# Patient Record
Sex: MALE | Race: WHITE | Employment: FULL TIME | URBAN - METROPOLITAN AREA
[De-identification: names, ages, dates, MRNs, and addresses within clinical notes are randomized per-mention and may not be internally consistent; named-entity substitution may affect disease eponyms.]

---

## 2022-08-04 ENCOUNTER — APPOINTMENT (OUTPATIENT)
Dept: GENERAL RADIOLOGY | Age: 27
End: 2022-08-04

## 2022-08-04 ENCOUNTER — HOSPITAL ENCOUNTER (EMERGENCY)
Dept: CT IMAGING | Age: 27
Discharge: HOME | End: 2022-08-07

## 2022-08-04 ENCOUNTER — HOSPITAL ENCOUNTER (EMERGENCY)
Age: 27
Discharge: HOME | End: 2022-08-04
Attending: EMERGENCY MEDICINE

## 2022-08-04 VITALS
SYSTOLIC BLOOD PRESSURE: 148 MMHG | HEIGHT: 68 IN | RESPIRATION RATE: 15 BRPM | TEMPERATURE: 98 F | BODY MASS INDEX: 25.01 KG/M2 | WEIGHT: 165 LBS | OXYGEN SATURATION: 99 % | HEART RATE: 95 BPM | DIASTOLIC BLOOD PRESSURE: 96 MMHG

## 2022-08-04 DIAGNOSIS — S06.0X9A CONCUSSION WITH LOSS OF CONSCIOUSNESS, INITIAL ENCOUNTER: Primary | ICD-10-CM

## 2022-08-04 DIAGNOSIS — S43.102A ACROMIOCLAVICULAR SEPARATION, LEFT, INITIAL ENCOUNTER: ICD-10-CM

## 2022-08-04 LAB
ALBUMIN SERPL-MCNC: 4.4 G/DL (ref 3.5–5)
ALBUMIN/GLOB SERPL: 1.1 {RATIO} (ref 1.2–3.5)
ALP SERPL-CCNC: 60 U/L (ref 50–136)
ALT SERPL-CCNC: 64 U/L (ref 12–65)
ANION GAP SERPL CALC-SCNC: 7 MMOL/L (ref 7–16)
AST SERPL-CCNC: 40 U/L (ref 15–37)
BASOPHILS # BLD: 0.1 K/UL (ref 0–0.2)
BASOPHILS NFR BLD: 0 % (ref 0–2)
BILIRUB SERPL-MCNC: 0.6 MG/DL (ref 0.2–1.1)
BUN SERPL-MCNC: 30 MG/DL (ref 6–23)
CALCIUM SERPL-MCNC: 9.5 MG/DL (ref 8.3–10.4)
CHLORIDE SERPL-SCNC: 104 MMOL/L (ref 98–107)
CO2 SERPL-SCNC: 26 MMOL/L (ref 21–32)
CREAT SERPL-MCNC: 1 MG/DL (ref 0.8–1.5)
DIFFERENTIAL METHOD BLD: ABNORMAL
EOSINOPHIL # BLD: 0 K/UL (ref 0–0.8)
EOSINOPHIL NFR BLD: 0 % (ref 0.5–7.8)
ERYTHROCYTE [DISTWIDTH] IN BLOOD BY AUTOMATED COUNT: 11.9 % (ref 11.9–14.6)
GLOBULIN SER CALC-MCNC: 3.9 G/DL (ref 2.3–3.5)
GLUCOSE SERPL-MCNC: 113 MG/DL (ref 65–100)
HCT VFR BLD AUTO: 44 % (ref 41.1–50.3)
HGB BLD-MCNC: 15.6 G/DL (ref 13.6–17.2)
IMM GRANULOCYTES # BLD AUTO: 0.1 K/UL (ref 0–0.5)
IMM GRANULOCYTES NFR BLD AUTO: 1 % (ref 0–5)
LYMPHOCYTES # BLD: 1.6 K/UL (ref 0.5–4.6)
LYMPHOCYTES NFR BLD: 10 % (ref 13–44)
MCH RBC QN AUTO: 32 PG (ref 26.1–32.9)
MCHC RBC AUTO-ENTMCNC: 35.5 G/DL (ref 31.4–35)
MCV RBC AUTO: 90.3 FL (ref 79.6–97.8)
MONOCYTES # BLD: 1.2 K/UL (ref 0.1–1.3)
MONOCYTES NFR BLD: 7 % (ref 4–12)
NEUTS SEG # BLD: 13.4 K/UL (ref 1.7–8.2)
NEUTS SEG NFR BLD: 82 % (ref 43–78)
NRBC # BLD: 0 K/UL (ref 0–0.2)
PLATELET # BLD AUTO: 309 K/UL (ref 150–450)
PMV BLD AUTO: 9.2 FL (ref 9.4–12.3)
POTASSIUM SERPL-SCNC: 3.7 MMOL/L (ref 3.5–5.1)
PROT SERPL-MCNC: 8.3 G/DL (ref 6.3–8.2)
RBC # BLD AUTO: 4.87 M/UL (ref 4.23–5.6)
SODIUM SERPL-SCNC: 137 MMOL/L (ref 138–145)
WBC # BLD AUTO: 16.3 K/UL (ref 4.3–11.1)

## 2022-08-04 PROCEDURE — 80053 COMPREHEN METABOLIC PANEL: CPT

## 2022-08-04 PROCEDURE — 73030 X-RAY EXAM OF SHOULDER: CPT

## 2022-08-04 PROCEDURE — 70450 CT HEAD/BRAIN W/O DYE: CPT

## 2022-08-04 PROCEDURE — 99285 EMERGENCY DEPT VISIT HI MDM: CPT

## 2022-08-04 PROCEDURE — 71260 CT THORAX DX C+: CPT

## 2022-08-04 PROCEDURE — 72125 CT NECK SPINE W/O DYE: CPT

## 2022-08-04 PROCEDURE — 85025 COMPLETE CBC W/AUTO DIFF WBC: CPT

## 2022-08-04 PROCEDURE — 2580000003 HC RX 258: Performed by: PHYSICIAN ASSISTANT

## 2022-08-04 PROCEDURE — 6360000004 HC RX CONTRAST MEDICATION: Performed by: PHYSICIAN ASSISTANT

## 2022-08-04 RX ORDER — SODIUM CHLORIDE 0.9 % (FLUSH) 0.9 %
10 SYRINGE (ML) INJECTION
Status: COMPLETED | OUTPATIENT
Start: 2022-08-04 | End: 2022-08-04

## 2022-08-04 RX ORDER — 0.9 % SODIUM CHLORIDE 0.9 %
100 INTRAVENOUS SOLUTION INTRAVENOUS
Status: COMPLETED | OUTPATIENT
Start: 2022-08-04 | End: 2022-08-04

## 2022-08-04 RX ADMIN — SODIUM CHLORIDE 100 ML: 9 INJECTION, SOLUTION INTRAVENOUS at 17:11

## 2022-08-04 RX ADMIN — SODIUM CHLORIDE, PRESERVATIVE FREE 10 ML: 5 INJECTION INTRAVENOUS at 17:11

## 2022-08-04 RX ADMIN — IOPAMIDOL 100 ML: 755 INJECTION, SOLUTION INTRAVENOUS at 17:11

## 2022-08-04 ASSESSMENT — PAIN SCALES - GENERAL: PAINLEVEL_OUTOF10: 4

## 2022-08-04 NOTE — Clinical Note
Juan Jose Dsouza was seen and treated in our emergency department on 8/4/2022. He may return to work on 08/09/2022. If you have any questions or concerns, please don't hesitate to call.       Aylin Batista MD

## 2022-08-04 NOTE — ED TRIAGE NOTES
Patient is a 79-year-old male who fell from 1 story off of scaffolding while at work. He landed on his left side of his back/shoulder. Complaining of shoulder pain. His significant other with him says that he has been repeating the same things and he does not remember anything. He was drowsy but seems more awake now. Did strike his head. She was not there but is relating information from those were on the scene. He complains of rib pain and pain to the left collarbone and left shoulder. Awake and alert and oriented. Repeating the same thing about his shoulder. Guarding the left shoulder. Tender over left shoulder and chest wall. No abdominal tenderness. Patient evaluated initially in triage. Rapid Medical Evaluation was conducted and necessary orders have been placed. I have performed a medical screening exam. Care will now be transferred to the provider in the back of the emergency department.  DENISA Ricketts 4:48 PM

## 2022-08-04 NOTE — ED TRIAGE NOTES
Patient ambulatory to triage with mask in place. Patient reports he fell from about 1 floor up. Pt reports left shoulder pain. Pt has been nauseous. Pt has been forgetful and repeating the same thing. Pt does not remember event.

## 2022-08-05 ASSESSMENT — ENCOUNTER SYMPTOMS
EYES NEGATIVE: 1
GASTROINTESTINAL NEGATIVE: 1
RESPIRATORY NEGATIVE: 1

## 2022-08-05 NOTE — DISCHARGE INSTRUCTIONS
Tylenol or Advil as needed  Follow up with Dr. Cecile Isabel Monday for head recheck  Sling for comfort  Ice to shoulder  Return with worse or new symptoms

## 2022-08-05 NOTE — ED PROVIDER NOTES
[facility] Emergency Department Provider Note                   PCP:                Maria Del Rosario Tristan MD               Age: 31 y.o. Sex: male       ICD-10-CM    1. Concussion with loss of consciousness, initial encounter  S06. 0X9A       2. Acromioclavicular separation, left, initial encounter  S43.102A           DISPOSITION Decision To Discharge 08/04/2022 08:06:22 PM       MDM  Number of Diagnoses or Management Options  Acromioclavicular separation, left, initial encounter  Concussion with loss of consciousness, initial encounter  Diagnosis management comments: Fall from one story roof to floor  Concussion - amnesia for the events   CT brain - no acute changes  CT C spine no fracture  CT chest Abd pelvis - irregular lucency right upper sternum. On exam no tenderness at this site. Right shoulder - no fracture  Exam  right AC separation  Results and instructions discussed with patient and wife  Follow up with Ortho and PCP  Sling  Return with new or worse symptoms         Amount and/or Complexity of Data Reviewed  Clinical lab tests: ordered and reviewed  Tests in the radiology section of CPT®: ordered and reviewed  Obtain history from someone other than the patient: yes (wife)    Patient Progress  Patient progress: stable       Orders Placed This Encounter   Procedures    CT Head W/O Contrast    CT CSpine W/O Contrast    CT CHEST ABDOMEN PELVIS W CONTRAST    XR SHOULDER LEFT (MIN 2 VIEWS)    CBC with Auto Differential    CMP    Misc nursing order (specify)        Elias Brown is a 31 y.o. 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 He was on a roof of a one-story home. He fell to the floor. Landed on his right side. Coworker witnessed his fall. States he had loss of consciousness for a few seconds. Patient then got up and went and sat in the chair.   Called his wife to come get him.  He has amnesia for the events. His wife states that he kept repeating that his right shoulder hurt. He was able to recall all of his family and himself. Denies any weakness or numbness. No bowel or bladder dysfunction. His chief complaint now is of right shoulder pain. He denies any headache. No nausea or vomiting. All other systems reviewed and are negative. Review of Systems   Constitutional: Negative. HENT: Negative. Eyes: Negative. Respiratory: Negative. Cardiovascular: Negative. Gastrointestinal: Negative. Genitourinary: Negative. Musculoskeletal:  Positive for arthralgias and joint swelling. Skin: Negative. Neurological:  Negative for weakness, numbness and headaches. Amnesia   Hematological: Negative. Psychiatric/Behavioral: Negative. No past medical history on file. No past surgical history on file. No family history on file. Social History     Socioeconomic History    Marital status:         Allergies: Patient has no known allergies. There are no discharge medications for this patient. Vitals signs and nursing note reviewed. No data found. Physical Exam  Vitals and nursing note reviewed. Constitutional:       Appearance: He is normal weight. HENT:      Head: Normocephalic. Right Ear: Tympanic membrane normal.      Left Ear: Tympanic membrane normal.      Nose: Nose normal.      Mouth/Throat:      Mouth: Mucous membranes are dry. Eyes:      Extraocular Movements: Extraocular movements intact. Pupils: Pupils are equal, round, and reactive to light. Cardiovascular:      Rate and Rhythm: Normal rate and regular rhythm. Pulses: Normal pulses. Heart sounds: Normal heart sounds. Pulmonary:      Effort: Pulmonary effort is normal.      Breath sounds: Normal breath sounds. Abdominal:      General: Abdomen is flat. Palpations: Abdomen is soft. Tenderness:  There is no abdominal tenderness. Musculoskeletal:      Cervical back: Normal range of motion and neck supple. No tenderness. Comments: Left shoulder with tenderness and swelling. Tenderness over the [facility], [place], operated by [facility] joint. Pain limits his ROM. Elbow and wrist not tender. Chest with no tenderness over the sternum. Ribs not tender. Other extremities with no tenderness and normal ROM. Skin:     General: Skin is warm and dry. Comments: Sunburn shoulder and neck. Skin intact. Neurological:      General: No focal deficit present. Mental Status: He is alert and oriented to person, place, and time. Sensory: No sensory deficit. Motor: No weakness. Comments: Amnesia for all of today and recent past. Knows wife and sister in law. Psychiatric:         Mood and Affect: Mood normal.        Procedures    ED EKG Interpretation  EKG was interpreted in the absence of a cardiologist.    Labs Reviewed   CBC WITH AUTO DIFFERENTIAL - Abnormal; Notable for the following components:       Result Value    WBC 16.3 (*)     MCHC 35.5 (*)     MPV 9.2 (*)     Seg Neutrophils 82 (*)     Lymphocytes 10 (*)     Eosinophils % 0 (*)     Segs Absolute 13.4 (*)     All other components within normal limits   COMPREHENSIVE METABOLIC PANEL - Abnormal; Notable for the following components:    Sodium 137 (*)     Glucose 113 (*)     BUN 30 (*)     AST 40 (*)     Total Protein 8.3 (*)     Globulin 3.9 (*)     Albumin/Globulin Ratio 1.1 (*)     All other components within normal limits        CT Head W/O Contrast   Final Result   No CT evidence of acute intracranial abnormality. CT CSpine W/O Contrast   Final Result   No acute osseous abnormality. CT CHEST ABDOMEN PELVIS W CONTRAST   Final Result   1. Acute nondisplaced sternal fracture. 2. No evidence of traumatic organ injury. XR SHOULDER LEFT (MIN 2 VIEWS)   Final Result   No acute osseous abnormality.                                Voice dictation software was used during the making of this note. This software is not perfect and grammatical and other typographical errors may be present. This note has not been completely proofread for errors.      Aylin Batista MD  08/05/22 1888

## 2022-08-08 ENCOUNTER — TELEPHONE (OUTPATIENT)
Dept: ORTHOPEDIC SURGERY | Age: 27
End: 2022-08-08

## 2022-08-08 NOTE — TELEPHONE ENCOUNTER
Called an spoke with patient.  He has a follow up with his PCP today and then would call back to set up an appointment with Dr. Yousif Cornell